# Patient Record
Sex: FEMALE | Race: WHITE | Employment: FULL TIME | URBAN - METROPOLITAN AREA
[De-identification: names, ages, dates, MRNs, and addresses within clinical notes are randomized per-mention and may not be internally consistent; named-entity substitution may affect disease eponyms.]

---
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
## 2018-05-04 ENCOUNTER — TELEPHONE (OUTPATIENT)
Dept: FAMILY MEDICINE CLINIC | Facility: CLINIC | Age: 18
End: 2018-05-04

## 2018-05-07 PROBLEM — Z83.2 FAMILY HISTORY OF AUTOIMMUNE DISORDER: Status: ACTIVE | Noted: 2018-05-07

## 2018-05-07 PROBLEM — F39 MOOD DISORDER (HCC): Status: ACTIVE | Noted: 2018-05-07

## 2018-05-07 PROBLEM — Z30.41 ENCOUNTER FOR SURVEILLANCE OF CONTRACEPTIVE PILLS: Status: ACTIVE | Noted: 2018-05-07

## 2018-05-07 PROBLEM — M25.50 PAIN IN JOINTS: Status: ACTIVE | Noted: 2018-05-07

## 2018-05-07 PROBLEM — F32.5 MAJOR DEPRESSIVE DISORDER WITH SINGLE EPISODE, IN FULL REMISSION (HCC): Status: ACTIVE | Noted: 2018-05-07

## 2018-05-07 PROCEDURE — 82306 VITAMIN D 25 HYDROXY: CPT | Performed by: FAMILY MEDICINE

## 2018-05-07 PROCEDURE — 86038 ANTINUCLEAR ANTIBODIES: CPT | Performed by: FAMILY MEDICINE

## 2018-05-07 PROCEDURE — 80050 GENERAL HEALTH PANEL: CPT | Performed by: FAMILY MEDICINE

## 2018-05-07 PROCEDURE — 82728 ASSAY OF FERRITIN: CPT | Performed by: FAMILY MEDICINE

## 2018-05-07 PROCEDURE — 86140 C-REACTIVE PROTEIN: CPT | Performed by: FAMILY MEDICINE

## 2018-05-07 PROCEDURE — 86431 RHEUMATOID FACTOR QUANT: CPT | Performed by: FAMILY MEDICINE

## 2018-05-07 PROCEDURE — 85652 RBC SED RATE AUTOMATED: CPT | Performed by: FAMILY MEDICINE

## 2018-05-07 PROCEDURE — 86200 CCP ANTIBODY: CPT | Performed by: FAMILY MEDICINE

## 2018-05-07 NOTE — PROGRESS NOTES
Lynsey Roland is a 19year old female. Patient presents with:  Pap: AND JOINT AND MUSCLE PAIN PER PT      HPI:   OCP: Has been on this for a couple yrs and doing well. Has had paps in the past, all normal. Not sexually active. No h/o STI's.  No dischar by mouth daily.  Disp:  Rfl:       Past Medical History:   Diagnosis Date   • Allergic rhinitis, cause unspecified    • Anxiety state, unspecified    • Personal history of unspecified urinary disorder 10/2/07    Hx of vesico-ureteral reflux   • Varicella wi b/l UE and LE, sensation grossly intact.      ASSESSMENT AND PLAN:     Mood disorder (hcc)  (primary encounter diagnosis)  Major depressive disorder with single episode, in full remission (hcc)  Encounter for surveillance of contraceptive pills  Pain in keenan (AUTOMATED); Future  -     C-REACTIVE PROTEIN; Future  -     ITZEL, DIRECT, REFLEX TO 9 ENAS; Future  -     CBC W/ DIFFERENTIAL    Family history of autoimmune disorder  -     CBC WITH DIFFERENTIAL WITH PLATELET; Future  -     COMP METABOLIC PANEL (14);  Futu

## 2018-05-08 ENCOUNTER — TELEPHONE (OUTPATIENT)
Dept: FAMILY MEDICINE CLINIC | Facility: CLINIC | Age: 18
End: 2018-05-08

## 2018-05-08 DIAGNOSIS — Z30.41 ENCOUNTER FOR SURVEILLANCE OF CONTRACEPTIVE PILLS: ICD-10-CM

## 2018-05-08 RX ORDER — NORETHINDRONE ACETATE AND ETHINYL ESTRADIOL 1MG-20(21)
1 KIT ORAL DAILY
Qty: 3 PACKAGE | Refills: 3 | Status: SHIPPED | OUTPATIENT
Start: 2018-05-08 | End: 2019-04-12

## 2018-05-08 NOTE — TELEPHONE ENCOUNTER
Birth control was sent to [facility] but [facility] needs [facility].

## 2018-05-09 ENCOUNTER — MED REC SCAN ONLY (OUTPATIENT)
Dept: FAMILY MEDICINE CLINIC | Facility: CLINIC | Age: 18
End: 2018-05-09

## 2018-05-09 ENCOUNTER — TELEPHONE (OUTPATIENT)
Dept: FAMILY MEDICINE CLINIC | Facility: CLINIC | Age: 18
End: 2018-05-09

## 2018-05-09 NOTE — TELEPHONE ENCOUNTER
----- Message from Sahra Neves DO sent at 5/9/2018 11:06 AM CDT -----  Pls let pt know that her labs all came back normal, including her rheumatoid factor and ITZEL test, inflammatory markers, blood cell counts, iron, vit D, TSH, glucose, liver and kidne

## 2018-05-10 NOTE — TELEPHONE ENCOUNTER
Patient mother notified and verbalized understanding. States she has number for Dr Cipriano Harrell.

## 2018-05-29 ENCOUNTER — APPOINTMENT (OUTPATIENT)
Dept: OTHER | Facility: HOSPITAL | Age: 18
End: 2018-05-29
Attending: PREVENTIVE MEDICINE

## 2018-10-18 ENCOUNTER — OFFICE VISIT (OUTPATIENT)
Dept: FAMILY MEDICINE CLINIC | Facility: CLINIC | Age: 18
End: 2018-10-18
Payer: OTHER GOVERNMENT

## 2018-10-18 VITALS
HEIGHT: 65.25 IN | TEMPERATURE: 98 F | SYSTOLIC BLOOD PRESSURE: 100 MMHG | DIASTOLIC BLOOD PRESSURE: 54 MMHG | HEART RATE: 66 BPM | BODY MASS INDEX: 22.99 KG/M2 | WEIGHT: 139.63 LBS | RESPIRATION RATE: 20 BRPM

## 2018-10-18 DIAGNOSIS — Z20.2 EXPOSURE TO SEXUALLY TRANSMITTED DISEASE (STD): ICD-10-CM

## 2018-10-18 DIAGNOSIS — Z00.00 WELL WOMAN EXAM WITHOUT GYNECOLOGICAL EXAM: Primary | ICD-10-CM

## 2018-10-18 PROCEDURE — 99395 PREV VISIT EST AGE 18-39: CPT | Performed by: FAMILY MEDICINE

## 2018-10-18 PROCEDURE — 87389 HIV-1 AG W/HIV-1&-2 AB AG IA: CPT | Performed by: FAMILY MEDICINE

## 2018-10-18 PROCEDURE — 87491 CHLMYD TRACH DNA AMP PROBE: CPT | Performed by: FAMILY MEDICINE

## 2018-10-18 PROCEDURE — 86803 HEPATITIS C AB TEST: CPT | Performed by: FAMILY MEDICINE

## 2018-10-18 PROCEDURE — 36415 COLL VENOUS BLD VENIPUNCTURE: CPT | Performed by: FAMILY MEDICINE

## 2018-10-18 PROCEDURE — 86706 HEP B SURFACE ANTIBODY: CPT | Performed by: FAMILY MEDICINE

## 2018-10-18 PROCEDURE — 87340 HEPATITIS B SURFACE AG IA: CPT | Performed by: FAMILY MEDICINE

## 2018-10-18 PROCEDURE — 86780 TREPONEMA PALLIDUM: CPT | Performed by: FAMILY MEDICINE

## 2018-10-18 PROCEDURE — 87591 N.GONORRHOEAE DNA AMP PROB: CPT | Performed by: FAMILY MEDICINE

## 2018-10-18 RX ORDER — MELATONIN
1000 DAILY
COMMUNITY
End: 2021-11-11

## 2018-10-18 RX ORDER — MAGNESIUM OXIDE 400 MG (241.3 MG MAGNESIUM) TABLET
400 TABLET DAILY
COMMUNITY
End: 2021-11-11

## 2018-10-18 NOTE — PROGRESS NOTES
HPI:   Margarita Triplett is a 19year old female who presents for a complete physical exam. Symptoms: denies discharge, itching, burning or dysuria, periods are regular. Patient complains of nothing new today.      Would like breast exam.   Has a new partne Disp: 3 Package Rfl: 3   Vitamin B-12 1000 MCG Oral Tab Take 1,000 mcg by mouth 3 (three) times daily. Taking once daily=3000mg  Disp:  Rfl:    Cholecalciferol (VITAMIN D-3) 5000 units Oral Tab Take 1 tablet by mouth.  Disp:  Rfl:    Omega-3 Fatty Acids (FI Wt 139 lb 9.6 oz   LMP 09/25/2018 (Exact Date)   Breastfeeding? No   BMI 23.05 kg/m²   Body mass index is 23.05 kg/m².    GENERAL: well developed, well nourished,in no apparent distress  SKIN: no rashes,no suspicious lesions  HEENT: atraumatic, normocepha

## 2019-03-07 NOTE — PROGRESS NOTES
Yocasta Reyes is a 22year old female. HPI:   Shilpi Garrido is here for discussion of starting back on her zoloft, she states she felt better on it and the only issues she had was when she would forget to take it.  For the most part she feels good about 60% go back  On zoloft  EXAM:   /58 (BP Location: Right arm, Patient Position: Sitting, Cuff Size: adult)   Pulse 64   Temp 98 °F (36.7 °C) (Temporal)   Resp 16   Ht 66\"   Wt 143 lb   LMP 02/20/2019   BMI 23.08 kg/m²   GENERAL: well developed, well nour

## 2019-03-28 NOTE — PROGRESS NOTES
Gi Rubalcava is a 22year old female. HPI:   Chelseymary Cohen is here for discussion of starting back on her zoloft, she states she felt better on it and the only issues she had was when she would forget to take it.  For the most part she feels good about 60% °C) (Temporal)   Resp 22   Ht 66\"   Wt 142 lb   LMP 03/15/2019   BMI 22.92 kg/m²   GENERAL: well developed, well nourished,in no apparent distress  SKIN: no rashes,no suspicious lesions  HEENT: atraumatic, normocephalic,ears and throat are clear  NECK: nascimento

## 2019-04-03 ENCOUNTER — TELEPHONE (OUTPATIENT)
Dept: FAMILY MEDICINE CLINIC | Facility: CLINIC | Age: 19
End: 2019-04-03

## 2019-04-03 NOTE — TELEPHONE ENCOUNTER
Mom called, needs prescription for Sertraline HCl 50 MG Oral Tab to go to [facility] from now on. Pt has plenty, Mom just wanted to let us know that from now on this script needs to go to [facility] and for 90 days refills.   Any questions please

## 2019-04-12 DIAGNOSIS — Z30.41 ENCOUNTER FOR SURVEILLANCE OF CONTRACEPTIVE PILLS: ICD-10-CM

## 2019-04-12 RX ORDER — NORETHINDRONE ACETATE AND ETHINYL ESTRADIOL 1MG-20(21)
KIT ORAL
Qty: 3 PACKAGE | Refills: 3 | Status: SHIPPED | OUTPATIENT
Start: 2019-04-12 | End: 2020-03-13

## 2019-04-12 NOTE — TELEPHONE ENCOUNTER
Sertraline HCl 50 MG Oral Tab 90 days   [address] [facility], [facility] [phone], [phone]

## 2019-04-12 NOTE — TELEPHONE ENCOUNTER
Last refilled on 3/7/19 for # 30 with 1 refills  Last OV 3/28/19  No future appointments. Thank you.

## 2019-04-30 NOTE — TELEPHONE ENCOUNTER
MOM CALLED AND ADV THAT SCRIPT THAT WAS SENT TO [facility] IS MORE THAN SENDING IT TO [facility].     PLEASE SEND SCRIPT TO [facility]    Sertraline HCl 50 MG Oral Tab    PLEASE CALL IF ANY QUESTIONS    THANK YOU

## 2020-03-13 DIAGNOSIS — Z30.41 ENCOUNTER FOR SURVEILLANCE OF CONTRACEPTIVE PILLS: ICD-10-CM

## 2020-03-13 RX ORDER — NORETHINDRONE ACETATE AND ETHINYL ESTRADIOL AND FERROUS FUMARATE 1MG-20(21)
KIT ORAL
Qty: 84 TABLET | Refills: 3 | Status: SHIPPED | OUTPATIENT
Start: 2020-03-13 | End: 2020-06-18 | Stop reason: DRUGHIGH

## 2020-03-13 NOTE — TELEPHONE ENCOUNTER
Gynecology Medication Protocol Failed3/13 2:44 AM   PASS-PENDING LAST PAP WNL--VIA MANUAL LOOKUP    Physical or Pelvic/Breast in past 12 or next 3 mos--VIA MANUAL LOOKUP     Last refill - [facility] -4/12/19 - #3 with 3 refills  No pap in Epic.   Last physi

## 2020-06-17 PROCEDURE — 84439 ASSAY OF FREE THYROXINE: CPT | Performed by: FAMILY MEDICINE

## 2020-06-17 PROCEDURE — 85025 COMPLETE CBC W/AUTO DIFF WBC: CPT | Performed by: FAMILY MEDICINE

## 2020-06-17 PROCEDURE — 36415 COLL VENOUS BLD VENIPUNCTURE: CPT | Performed by: FAMILY MEDICINE

## 2020-06-17 PROCEDURE — 84443 ASSAY THYROID STIM HORMONE: CPT | Performed by: FAMILY MEDICINE

## 2020-06-17 NOTE — PROGRESS NOTES
Mart Upton is a 23year old female. HPI:   Kat Frias presents today for discussion of her BCP , shoulder pain and anxiety issues.    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

## 2020-06-18 ENCOUNTER — TELEPHONE (OUTPATIENT)
Dept: FAMILY MEDICINE CLINIC | Facility: CLINIC | Age: 20
End: 2020-06-18

## 2020-06-18 RX ORDER — NORETHINDRONE ACETATE AND ETHINYL ESTRADIOL .03; 1.5 MG/1; MG/1
TABLET ORAL
Qty: 28 TABLET | Refills: 2 | Status: SHIPPED | OUTPATIENT
Start: 2020-06-18 | End: 2021-11-11

## 2020-06-18 NOTE — TELEPHONE ENCOUNTER
----- Message from Tabatha Amin DO sent at 6/18/2020  8:06 AM CDT -----  Can notify Kat Hernandezen her labs looked good, I am switching  The dose on her microgestin to 1.5/30, it may take 3 cycles for her period to regulate.  Lets keep the appt in 2 weeks to fol

## 2020-07-15 NOTE — TELEPHONE ENCOUNTER
Got a [facility] but was sent to [facility] to be refilled. Spoke to patient and she would like it to be sent through [facility]. Thanks!

## 2020-10-13 ENCOUNTER — TELEMEDICINE (OUTPATIENT)
Dept: FAMILY MEDICINE CLINIC | Facility: CLINIC | Age: 20
End: 2020-10-13
Payer: OTHER GOVERNMENT

## 2020-10-13 DIAGNOSIS — J30.1 ALLERGIC RHINITIS DUE TO POLLEN, UNSPECIFIED SEASONALITY: Primary | ICD-10-CM

## 2020-10-13 DIAGNOSIS — R09.81 SINUS CONGESTION: ICD-10-CM

## 2020-10-13 PROCEDURE — 99213 OFFICE O/P EST LOW 20 MIN: CPT | Performed by: FAMILY MEDICINE

## 2020-10-13 NOTE — PROGRESS NOTES
This is a telemedicine visit with live, interactive video and audio. Patient understands and accepts financial responsibility for any deductible, co-insurance and/or co-pays associated with this service.     Tab Bernard is having issues with her seasonality    Sinus congestion      Allergic rhinitis due to pollen, unspecified seasonality  (primary encounter diagnosis)  Sinus congestion  Use some OTC flonase 2 puffs per nostril at hs daily and call regarding efficacy  Can try some OTC Allegra or an

## 2021-11-11 ENCOUNTER — OFFICE VISIT (OUTPATIENT)
Dept: FAMILY MEDICINE CLINIC | Facility: CLINIC | Age: 21
End: 2021-11-11
Payer: OTHER GOVERNMENT

## 2021-11-11 VITALS
DIASTOLIC BLOOD PRESSURE: 50 MMHG | RESPIRATION RATE: 16 BRPM | HEART RATE: 84 BPM | WEIGHT: 159 LBS | HEIGHT: 66 IN | BODY MASS INDEX: 25.55 KG/M2 | SYSTOLIC BLOOD PRESSURE: 94 MMHG | TEMPERATURE: 98 F

## 2021-11-11 DIAGNOSIS — Z11.3 SCREENING EXAMINATION FOR STD (SEXUALLY TRANSMITTED DISEASE): ICD-10-CM

## 2021-11-11 DIAGNOSIS — Z00.00 ROUTINE HEALTH MAINTENANCE: Primary | ICD-10-CM

## 2021-11-11 PROCEDURE — 3008F BODY MASS INDEX DOCD: CPT | Performed by: FAMILY MEDICINE

## 2021-11-11 PROCEDURE — 99395 PREV VISIT EST AGE 18-39: CPT | Performed by: FAMILY MEDICINE

## 2021-11-11 PROCEDURE — 3074F SYST BP LT 130 MM HG: CPT | Performed by: FAMILY MEDICINE

## 2021-11-11 PROCEDURE — 3078F DIAST BP <80 MM HG: CPT | Performed by: FAMILY MEDICINE

## 2021-11-11 NOTE — PROGRESS NOTES
HPI:   Romulo Arriaga is a 19year old female who presents for a complete physical exam. Symptoms: denies discharge, itching, burning or dysuria. Patient complains of nothing at this time.  She would like to get STD tested for the sake of ,making sure sh • Varicella without mention of complication     Hx of chickenpox - had disease 2/15/02      Past Surgical History:   Procedure Laterality Date   • ADENOIDECTOMY     • HC IMPLANT EAR TUBES     • MYRINGOTOMY, LASER-ASSISTED     • TONSILLECTOMY  2011      N HSM or tenderness  :introitus is normal,scant discharge,cervix is pink,no adnexal masses or tenderness, PAP was done   RECTAL:good rectal tone, stool is OB negative  MUSCULOSKELETAL: back is not tender,FROM of the back  EXTREMITIES: no cyanosis, clubbing

## 2021-11-12 ENCOUNTER — MED REC SCAN ONLY (OUTPATIENT)
Dept: FAMILY MEDICINE CLINIC | Facility: CLINIC | Age: 21
End: 2021-11-12

## 2021-11-12 NOTE — PROGRESS NOTES
Mental Health Certification for firearm possession form completed . Mailed to the address on the form. Copy sent to scanning. Patient sent a my chart message with this information.

## 2021-12-03 ENCOUNTER — NURSE ONLY (OUTPATIENT)
Dept: FAMILY MEDICINE CLINIC | Facility: CLINIC | Age: 21
End: 2021-12-03
Payer: OTHER GOVERNMENT

## 2021-12-03 DIAGNOSIS — Z11.3 SCREENING EXAMINATION FOR STD (SEXUALLY TRANSMITTED DISEASE): ICD-10-CM

## 2021-12-03 DIAGNOSIS — Z00.00 ROUTINE HEALTH MAINTENANCE: ICD-10-CM

## 2021-12-03 PROCEDURE — 86780 TREPONEMA PALLIDUM: CPT | Performed by: FAMILY MEDICINE

## 2021-12-03 PROCEDURE — 80050 GENERAL HEALTH PANEL: CPT | Performed by: FAMILY MEDICINE

## 2021-12-03 PROCEDURE — 87389 HIV-1 AG W/HIV-1&-2 AB AG IA: CPT | Performed by: FAMILY MEDICINE

## 2021-12-03 PROCEDURE — 87591 N.GONORRHOEAE DNA AMP PROB: CPT | Performed by: FAMILY MEDICINE

## 2021-12-03 PROCEDURE — 87491 CHLMYD TRACH DNA AMP PROBE: CPT | Performed by: FAMILY MEDICINE

## 2021-12-03 PROCEDURE — 80061 LIPID PANEL: CPT | Performed by: FAMILY MEDICINE

## 2021-12-03 NOTE — PROGRESS NOTES
Gab Guerrero present in office for nurse visit. Labs as ordered by Dr. Magdalena Vazquez; 23 g, Right AC, lavender tube and gold tube and Urine sample obtained   (x3 lavenders, x3 golds - 1 for green)     All questions/concerns addressed.  Patient left in stable

## 2021-12-04 ENCOUNTER — TELEPHONE (OUTPATIENT)
Dept: FAMILY MEDICINE CLINIC | Facility: CLINIC | Age: 21
End: 2021-12-04

## 2021-12-04 NOTE — TELEPHONE ENCOUNTER
Left detailed message for the pt with the results and advised that I will send a [facility] as well

## 2021-12-04 NOTE — TELEPHONE ENCOUNTER
----- Message from Kymberly Lyon DO sent at 12/3/2021  8:51 PM CST -----  Can notify Maru Rafal her STD tests were negative and the rest of her labs looked great as well.  Including cholesterol kidney, liver , thyroid and blood count

## 2022-04-21 ENCOUNTER — OFFICE VISIT (OUTPATIENT)
Dept: FAMILY MEDICINE CLINIC | Facility: CLINIC | Age: 22
End: 2022-04-21
Payer: OTHER GOVERNMENT

## 2022-04-21 VITALS
WEIGHT: 157.81 LBS | RESPIRATION RATE: 16 BRPM | SYSTOLIC BLOOD PRESSURE: 100 MMHG | HEART RATE: 64 BPM | TEMPERATURE: 98 F | BODY MASS INDEX: 25 KG/M2 | DIASTOLIC BLOOD PRESSURE: 52 MMHG

## 2022-04-21 DIAGNOSIS — M25.531 WRIST PAIN, ACUTE, RIGHT: ICD-10-CM

## 2022-04-21 DIAGNOSIS — G56.01 CARPAL TUNNEL SYNDROME ON RIGHT: Primary | ICD-10-CM

## 2022-04-21 PROCEDURE — 3074F SYST BP LT 130 MM HG: CPT | Performed by: FAMILY MEDICINE

## 2022-04-21 PROCEDURE — 99213 OFFICE O/P EST LOW 20 MIN: CPT | Performed by: FAMILY MEDICINE

## 2022-04-21 PROCEDURE — 3078F DIAST BP <80 MM HG: CPT | Performed by: FAMILY MEDICINE

## 2022-07-21 ENCOUNTER — OFFICE VISIT (OUTPATIENT)
Dept: FAMILY MEDICINE CLINIC | Facility: CLINIC | Age: 22
End: 2022-07-21
Payer: OTHER GOVERNMENT

## 2022-07-21 VITALS
OXYGEN SATURATION: 99 % | WEIGHT: 152.63 LBS | DIASTOLIC BLOOD PRESSURE: 68 MMHG | SYSTOLIC BLOOD PRESSURE: 116 MMHG | BODY MASS INDEX: 24.53 KG/M2 | HEART RATE: 65 BPM | HEIGHT: 66 IN | TEMPERATURE: 98 F

## 2022-07-21 DIAGNOSIS — Z11.3 SCREENING EXAMINATION FOR STD (SEXUALLY TRANSMITTED DISEASE): Primary | ICD-10-CM

## 2022-07-21 LAB — T PALLIDUM AB SER QL IA: NONREACTIVE

## 2022-07-21 PROCEDURE — 87491 CHLMYD TRACH DNA AMP PROBE: CPT | Performed by: FAMILY MEDICINE

## 2022-07-21 PROCEDURE — 87389 HIV-1 AG W/HIV-1&-2 AB AG IA: CPT | Performed by: FAMILY MEDICINE

## 2022-07-21 PROCEDURE — 87591 N.GONORRHOEAE DNA AMP PROB: CPT | Performed by: FAMILY MEDICINE

## 2022-07-21 PROCEDURE — 86780 TREPONEMA PALLIDUM: CPT | Performed by: FAMILY MEDICINE

## 2022-07-22 ENCOUNTER — TELEPHONE (OUTPATIENT)
Dept: FAMILY MEDICINE CLINIC | Facility: CLINIC | Age: 22
End: 2022-07-22

## 2022-07-22 ENCOUNTER — MOBILE ENCOUNTER (OUTPATIENT)
Dept: FAMILY MEDICINE CLINIC | Facility: CLINIC | Age: 22
End: 2022-07-22

## 2022-07-22 LAB
C TRACH DNA SPEC QL NAA+PROBE: NEGATIVE
N GONORRHOEA DNA SPEC QL NAA+PROBE: NEGATIVE

## 2022-07-22 RX ORDER — FLUCONAZOLE 100 MG/1
100 TABLET ORAL DAILY
Qty: 5 TABLET | Refills: 0 | Status: SHIPPED | OUTPATIENT
Start: 2022-07-22 | End: 2022-07-27

## 2022-07-22 NOTE — TELEPHONE ENCOUNTER
----- Message from Tom Lucas DO sent at 7/22/2022 11:43 AM CDT -----  Please notify Norberto Tineo her STD testing was negative

## 2022-08-11 RX ORDER — NITROFURANTOIN 25; 75 MG/1; MG/1
100 CAPSULE ORAL 2 TIMES DAILY
Qty: 10 CAPSULE | Refills: 0 | Status: SHIPPED | OUTPATIENT
Start: 2022-08-11 | End: 2022-08-16

## 2022-09-01 ENCOUNTER — OFFICE VISIT (OUTPATIENT)
Dept: FAMILY MEDICINE CLINIC | Facility: CLINIC | Age: 22
End: 2022-09-01
Payer: OTHER GOVERNMENT

## 2022-09-01 VITALS
HEART RATE: 65 BPM | TEMPERATURE: 97 F | OXYGEN SATURATION: 98 % | DIASTOLIC BLOOD PRESSURE: 62 MMHG | SYSTOLIC BLOOD PRESSURE: 100 MMHG | BODY MASS INDEX: 24 KG/M2 | RESPIRATION RATE: 16 BRPM | WEIGHT: 150.5 LBS

## 2022-09-01 DIAGNOSIS — Z12.4 SCREENING FOR CERVICAL CANCER: Primary | ICD-10-CM

## 2022-09-01 DIAGNOSIS — N89.8 VAGINAL DISCHARGE: ICD-10-CM

## 2022-09-01 PROCEDURE — 87591 N.GONORRHOEAE DNA AMP PROB: CPT | Performed by: FAMILY MEDICINE

## 2022-09-01 PROCEDURE — 3074F SYST BP LT 130 MM HG: CPT | Performed by: FAMILY MEDICINE

## 2022-09-01 PROCEDURE — 99213 OFFICE O/P EST LOW 20 MIN: CPT | Performed by: FAMILY MEDICINE

## 2022-09-01 PROCEDURE — 87491 CHLMYD TRACH DNA AMP PROBE: CPT | Performed by: FAMILY MEDICINE

## 2022-09-01 PROCEDURE — 3078F DIAST BP <80 MM HG: CPT | Performed by: FAMILY MEDICINE

## 2022-09-02 LAB
C TRACH DNA SPEC QL NAA+PROBE: NEGATIVE
N GONORRHOEA DNA SPEC QL NAA+PROBE: NEGATIVE

## 2022-09-19 LAB — HPV I/H RISK 1 DNA SPEC QL NAA+PROBE: POSITIVE

## 2022-09-20 LAB
HPV16 DNA CVX QL PROBE+SIG AMP: NEGATIVE
HPV18 DNA CVX QL PROBE+SIG AMP: NEGATIVE
LAST PAP RESULT: NORMAL
PAP HISTORY (OTHER THAN LAST PAP): NORMAL

## 2022-09-21 ENCOUNTER — TELEPHONE (OUTPATIENT)
Dept: FAMILY MEDICINE CLINIC | Facility: CLINIC | Age: 22
End: 2022-09-21

## 2022-09-21 NOTE — TELEPHONE ENCOUNTER
----- Message from Bishop Sanchez DO sent at 9/21/2022 10:19 AM CDT -----  Notified of results, she had ASCUS on her PAP, with a positive high risk HPV, but NEG for 16-18/45.  Would like her to get a repeat PAP in 6 months, with testing and also requesting repeat STD testing at that time

## 2022-11-11 ENCOUNTER — PATIENT MESSAGE (OUTPATIENT)
Dept: FAMILY MEDICINE CLINIC | Facility: CLINIC | Age: 22
End: 2022-11-11

## 2022-11-11 NOTE — TELEPHONE ENCOUNTER
From: Bora Espinal  To: Nestor Zaman DO  Sent: 11/11/2022 3:13 PM CST  Subject: Blood work    Hello! Should I fast before my blood work next week?

## 2022-11-15 ENCOUNTER — OFFICE VISIT (OUTPATIENT)
Dept: FAMILY MEDICINE CLINIC | Facility: CLINIC | Age: 22
End: 2022-11-15
Payer: OTHER GOVERNMENT

## 2022-11-15 VITALS
DIASTOLIC BLOOD PRESSURE: 62 MMHG | BODY MASS INDEX: 24 KG/M2 | RESPIRATION RATE: 16 BRPM | SYSTOLIC BLOOD PRESSURE: 104 MMHG | OXYGEN SATURATION: 98 % | TEMPERATURE: 98 F | WEIGHT: 151.13 LBS | HEART RATE: 51 BPM

## 2022-11-15 DIAGNOSIS — E63.9 NUTRITIONAL DEFICIENCY: ICD-10-CM

## 2022-11-15 DIAGNOSIS — L65.9 HAIR LOSS DISORDER: Primary | ICD-10-CM

## 2022-11-15 PROCEDURE — 3078F DIAST BP <80 MM HG: CPT | Performed by: FAMILY MEDICINE

## 2022-11-15 PROCEDURE — 3074F SYST BP LT 130 MM HG: CPT | Performed by: FAMILY MEDICINE

## 2022-11-15 PROCEDURE — 99213 OFFICE O/P EST LOW 20 MIN: CPT | Performed by: FAMILY MEDICINE

## (undated) NOTE — LETTER
[facility]  [address]  [place] [address]  [phone]                6/29/2020        Romulo Arriaga  [address] [place], [place] [id number]      Dear Romulo Arriaga.     To help

## (undated) NOTE — MR AVS SNAPSHOT
[address] [facility] [place] [address]  [phone]               Thank you for choosing us for your health care visit with Fernando Paulson DO.   We are glad to serve you and happy to provide you with this sum Take 1 tablet (50 mg total) by mouth daily.  1/2 tab daily for 1 week then daily   Commonly known as:  ZOLOFT                Where to Get Your Medications      These medications were sent to [facility] [place] , [address]-

## (undated) NOTE — MR AVS SNAPSHOT
[address] [facility]  [place]  [address]  [phone]               Thank you for choosing us for your health care visit with Roderick Garcia PA-C.   We are glad to serve you and happy to provide you with provider before using these medicines. Do not give aspirin to anyone under 25years of age who has a fever. It may cause severe illness or death.   Follow-up care  Follow up with your healthcare provider, or as advised, in 2 weeks if all symptoms have not g Enter your [employer] Activation Code exactly as it appears below along with your Zip Code and Date of Birth to complete the sign-up process. If you do not sign up before the expiration date, you must request a new code.     Your unique [employer] Access Code: BG